# Patient Record
Sex: FEMALE | HISPANIC OR LATINO | URBAN - NONMETROPOLITAN AREA
[De-identification: names, ages, dates, MRNs, and addresses within clinical notes are randomized per-mention and may not be internally consistent; named-entity substitution may affect disease eponyms.]

---

## 2018-06-11 ENCOUNTER — TESTING ONLY (OUTPATIENT)
Dept: URBAN - NONMETROPOLITAN AREA CLINIC 9 | Facility: CLINIC | Age: 73
End: 2018-06-11
Payer: COMMERCIAL

## 2018-06-11 PROCEDURE — 92083 EXTENDED VISUAL FIELD XM: CPT | Performed by: OPTOMETRIST

## 2018-10-11 ENCOUNTER — OFFICE VISIT (OUTPATIENT)
Dept: URBAN - NONMETROPOLITAN AREA CLINIC 9 | Facility: CLINIC | Age: 73
End: 2018-10-11
Payer: COMMERCIAL

## 2018-10-11 PROCEDURE — 92014 COMPRE OPH EXAM EST PT 1/>: CPT | Performed by: OPTOMETRIST

## 2018-10-11 ASSESSMENT — INTRAOCULAR PRESSURE
OD: 16
OS: 14

## 2018-10-11 NOTE — IMPRESSION/PLAN
Impression: Adverse effect of antimalarial, subsequent encounter: T37.2X5D. 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 Mac OCT and VF 10-2 was done in [place] in May 2018. Patient taking plaquenil 200 mg PO BID x 6-7 years, then was switched to QD x ~ 6 years. Patient weighs about 140 lbs and is within the 5 mg/kg/day dosage recommendation.

## 2021-01-08 ENCOUNTER — OFFICE VISIT (OUTPATIENT)
Dept: URBAN - NONMETROPOLITAN AREA CLINIC 9 | Facility: CLINIC | Age: 76
End: 2021-01-08
Payer: COMMERCIAL

## 2021-01-08 DIAGNOSIS — L93.0 DISCOID LUPUS ERYTHEMATOSUS: Primary | Chronic | ICD-10-CM

## 2021-01-08 DIAGNOSIS — H04.123 DRY EYE SYNDROME OF BILATERAL LACRIMAL GLANDS: Chronic | ICD-10-CM

## 2021-01-08 DIAGNOSIS — T37.2X5D ADVERSE EFFECT OF ANTIMALARIAL, SUBSEQUENT ENCOUNTER: Chronic | ICD-10-CM

## 2021-01-08 DIAGNOSIS — Z79.899 OTHER LONG TERM (CURRENT) DRUG THERAPY: ICD-10-CM

## 2021-01-08 DIAGNOSIS — Z96.1 PRESENCE OF INTRAOCULAR LENS: Chronic | ICD-10-CM

## 2021-01-08 DIAGNOSIS — H43.813 VITREOUS DEGENERATION, BILATERAL: Chronic | ICD-10-CM

## 2021-01-08 PROCEDURE — 92134 CPTRZ OPH DX IMG PST SGM RTA: CPT | Performed by: OPTOMETRIST

## 2021-01-08 PROCEDURE — 92014 COMPRE OPH EXAM EST PT 1/>: CPT | Performed by: OPTOMETRIST

## 2021-01-08 ASSESSMENT — VISUAL ACUITY: OD: 20/20

## 2021-01-08 ASSESSMENT — INTRAOCULAR PRESSURE
OS: 20
OD: 20

## 2021-01-08 ASSESSMENT — KERATOMETRY
OS: 43.13
OD: 42.50

## 2021-01-08 NOTE — IMPRESSION/PLAN
Impression: Discoid lupus erythematosus: L93.0. Plan: No Plaquenil maculopathy today; Ordered and performed Mac OCT today. Patient may continue taking Plaquenil. Patient is withing recommended guidelines of no more than 5 mg/kg/day. Continue monitoring annually.

## 2021-01-08 NOTE — IMPRESSION/PLAN
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 Plan: Discussed diagnosis in detail with patient. No treatment is required at this time. Will continue to observe condition and or symptoms. Patient instructed to call if condition gets worse.  Order Refraction per patient request.

## 2022-08-30 ENCOUNTER — OFFICE VISIT (OUTPATIENT)
Dept: URBAN - NONMETROPOLITAN AREA CLINIC 8 | Facility: CLINIC | Age: 77
End: 2022-08-30
Payer: COMMERCIAL

## 2022-08-30 DIAGNOSIS — H04.123 DRY EYE SYNDROME OF BILATERAL LACRIMAL GLANDS: ICD-10-CM

## 2022-08-30 DIAGNOSIS — H04.322 ACUTE DACRYOCYSTITIS OF LEFT LACRIMAL PASSAGE: Primary | ICD-10-CM

## 2022-08-30 PROCEDURE — 99213 OFFICE O/P EST LOW 20 MIN: CPT | Performed by: OPTOMETRIST

## 2022-08-30 RX ORDER — NEOMYCIN SULFATE, POLYMYXIN B SULFATE AND DEXAMETHASONE 3.5; 10000; 1 MG/ML; [USP'U]/ML; MG/ML
SUSPENSION OPHTHALMIC
Qty: 5 | Refills: 1 | Status: INACTIVE
Start: 2022-08-30 | End: 2022-09-01

## 2022-08-30 ASSESSMENT — INTRAOCULAR PRESSURE
OD: 20
OS: 18

## 2022-08-30 NOTE — IMPRESSION/PLAN
Impression: Acute dacryocystitis of left lacrimal passage: E22.333. Plan: Prescribed Maxitrol QID x10 days, then D/c. Start hot compresses TID x10 min stretches. eRx sent today.

## 2022-09-06 ENCOUNTER — OFFICE VISIT (OUTPATIENT)
Dept: URBAN - NONMETROPOLITAN AREA CLINIC 8 | Facility: CLINIC | Age: 77
End: 2022-09-06
Payer: COMMERCIAL

## 2022-09-06 DIAGNOSIS — B00.52 HERPESVIRAL KERATITIS: ICD-10-CM

## 2022-09-06 DIAGNOSIS — H04.322 ACUTE DACRYOCYSTITIS OF LEFT LACRIMAL PASSAGE: Primary | ICD-10-CM

## 2022-09-06 PROCEDURE — 99213 OFFICE O/P EST LOW 20 MIN: CPT | Performed by: OPTOMETRIST

## 2022-09-06 RX ORDER — POLYMYXIN B SULFATE AND TRIMETHOPRIM SULFATE 100000; 1 [USP'U]/ML; MG/ML
SOLUTION/ DROPS OPHTHALMIC
Qty: 5 | Refills: 0 | Status: ACTIVE
Start: 2022-09-06

## 2022-09-06 RX ORDER — ACYCLOVIR 400 MG/1
400 MG TABLET ORAL
Qty: 63 | Refills: 0 | Status: ACTIVE
Start: 2022-09-06

## 2022-09-06 ASSESSMENT — INTRAOCULAR PRESSURE
OS: 17
OD: 20

## 2022-09-06 NOTE — IMPRESSION/PLAN
Impression: Acute dacryocystitis of left lacrimal passage: A53.023. Plan: Stop Maxitrol. Continue hot compresses TID x10 min stretches.

## 2022-09-06 NOTE — IMPRESSION/PLAN
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 Plan: Prescribed Acyclovir PO 400mg every 8 hours x 21 days.  Start Polytrim OS TID x1 wk. eRx sent today

## 2023-07-05 ENCOUNTER — OFFICE VISIT (OUTPATIENT)
Dept: URBAN - NONMETROPOLITAN AREA CLINIC 8 | Facility: CLINIC | Age: 78
End: 2023-07-05
Payer: COMMERCIAL

## 2023-07-05 DIAGNOSIS — Z96.1 PRESENCE OF INTRAOCULAR LENS: ICD-10-CM

## 2023-07-05 DIAGNOSIS — H04.123 DRY EYE SYNDROME OF BILATERAL LACRIMAL GLANDS: ICD-10-CM

## 2023-07-05 DIAGNOSIS — Z79.899 OTHER LONG TERM (CURRENT) DRUG THERAPY: ICD-10-CM

## 2023-07-05 DIAGNOSIS — H43.813 VITREOUS DEGENERATION, BILATERAL: ICD-10-CM

## 2023-07-05 DIAGNOSIS — M06.9 RHEUMATOID ARTHRITIS, UNSPECIFIED: Primary | ICD-10-CM

## 2023-07-05 PROCEDURE — 92014 COMPRE OPH EXAM EST PT 1/>: CPT | Performed by: OPTOMETRIST

## 2023-07-05 PROCEDURE — 92134 CPTRZ OPH DX IMG PST SGM RTA: CPT | Performed by: OPTOMETRIST

## 2023-07-05 ASSESSMENT — INTRAOCULAR PRESSURE
OD: 19
OS: 16

## 2023-07-05 ASSESSMENT — KERATOMETRY
OD: 43.00
OS: 43.13

## 2023-07-05 NOTE — IMPRESSION/PLAN
Impression: Dry eye syndrome of bilateral lacrimal glands: H04.123. Plan: Discussed diagnosis and treatment options with patient. Pt instructed to use Systane Balance or Refresh Optive qid OU. (0) independent

## 2023-07-27 ENCOUNTER — TESTING ONLY (OUTPATIENT)
Dept: URBAN - NONMETROPOLITAN AREA CLINIC 8 | Facility: CLINIC | Age: 78
End: 2023-07-27
Payer: COMMERCIAL

## 2023-07-27 DIAGNOSIS — M06.9 RHEUMATOID ARTHRITIS, UNSPECIFIED: Primary | ICD-10-CM

## 2023-07-27 PROCEDURE — 92083 EXTENDED VISUAL FIELD XM: CPT | Performed by: OPTOMETRIST

## 2024-11-21 ENCOUNTER — OFFICE VISIT (OUTPATIENT)
Dept: URBAN - NONMETROPOLITAN AREA CLINIC 8 | Facility: CLINIC | Age: 79
End: 2024-11-21
Payer: COMMERCIAL

## 2024-11-21 DIAGNOSIS — T37.2X5D ADVERSE EFFECT OF ANTIMALARIAL, SUBSEQUENT ENCOUNTER: ICD-10-CM

## 2024-11-21 DIAGNOSIS — L93.0 LUPUS ERYTHEMATOSUS: Primary | ICD-10-CM

## 2024-11-21 DIAGNOSIS — Z96.1 PRESENCE OF PSEUDOPHAKIA: ICD-10-CM

## 2024-11-21 DIAGNOSIS — H04.123 DRY EYE SYNDROME OF BILATERAL LACRIMAL GLANDS: ICD-10-CM

## 2024-11-21 DIAGNOSIS — Z79.899 OTHER LONG TERM (CURRENT) DRUG THERAPY: ICD-10-CM

## 2024-11-21 DIAGNOSIS — H26.493 OTHER SECONDARY CATARACT, BILATERAL: ICD-10-CM

## 2024-11-21 PROCEDURE — 92014 COMPRE OPH EXAM EST PT 1/>: CPT | Performed by: OPTOMETRIST

## 2024-11-21 ASSESSMENT — INTRAOCULAR PRESSURE
OS: 17
OD: 18

## 2024-11-21 ASSESSMENT — KERATOMETRY
OS: 43.50
OD: 43.13